# Patient Record
Sex: MALE | Race: WHITE | NOT HISPANIC OR LATINO | Employment: FULL TIME | URBAN - METROPOLITAN AREA
[De-identification: names, ages, dates, MRNs, and addresses within clinical notes are randomized per-mention and may not be internally consistent; named-entity substitution may affect disease eponyms.]

---

## 2023-10-31 ENCOUNTER — TELEPHONE (OUTPATIENT)
Dept: PRIMARY CARE | Facility: CLINIC | Age: 63
End: 2023-10-31
Payer: COMMERCIAL

## 2023-11-01 ENCOUNTER — LAB (OUTPATIENT)
Dept: LAB | Facility: LAB | Age: 63
End: 2023-11-01
Payer: COMMERCIAL

## 2023-11-01 DIAGNOSIS — Z86.010 PERSONAL HISTORY OF COLONIC POLYPS: Primary | ICD-10-CM

## 2023-11-01 DIAGNOSIS — R74.8 ELEVATED LIVER ENZYMES: Primary | ICD-10-CM

## 2023-11-01 DIAGNOSIS — R10.13 ACUTE EPIGASTRIC PAIN: ICD-10-CM

## 2023-11-01 DIAGNOSIS — R10.13 EPIGASTRIC PAIN: Primary | ICD-10-CM

## 2023-11-01 DIAGNOSIS — R10.13 EPIGASTRIC PAIN: ICD-10-CM

## 2023-11-01 LAB
ALBUMIN SERPL BCP-MCNC: 4.3 G/DL (ref 3.4–5)
ALP SERPL-CCNC: 229 U/L (ref 33–136)
ALT SERPL W P-5'-P-CCNC: 399 U/L (ref 10–52)
AMYLASE SERPL-CCNC: 38 U/L (ref 29–103)
ANION GAP SERPL CALC-SCNC: 15 MMOL/L (ref 10–20)
AST SERPL W P-5'-P-CCNC: 183 U/L (ref 9–39)
BASOPHILS # BLD AUTO: 0.04 X10*3/UL (ref 0–0.1)
BASOPHILS NFR BLD AUTO: 0.8 %
BILIRUB SERPL-MCNC: 2.5 MG/DL (ref 0–1.2)
BUN SERPL-MCNC: 20 MG/DL (ref 6–23)
CALCIUM SERPL-MCNC: 9.6 MG/DL (ref 8.6–10.6)
CHLORIDE SERPL-SCNC: 98 MMOL/L (ref 98–107)
CHOLEST SERPL-MCNC: 203 MG/DL (ref 0–199)
CHOLESTEROL/HDL RATIO: 3.2
CO2 SERPL-SCNC: 29 MMOL/L (ref 21–32)
CREAT SERPL-MCNC: 1.38 MG/DL (ref 0.5–1.3)
EOSINOPHIL # BLD AUTO: 0.15 X10*3/UL (ref 0–0.7)
EOSINOPHIL NFR BLD AUTO: 3 %
ERYTHROCYTE [DISTWIDTH] IN BLOOD BY AUTOMATED COUNT: 12.7 % (ref 11.5–14.5)
GFR SERPL CREATININE-BSD FRML MDRD: 57 ML/MIN/1.73M*2
GGT SERPL-CCNC: 1040 U/L (ref 5–64)
GLUCOSE SERPL-MCNC: 324 MG/DL (ref 74–99)
HCT VFR BLD AUTO: 52.6 % (ref 41–52)
HDLC SERPL-MCNC: 62.6 MG/DL
HGB BLD-MCNC: 18.2 G/DL (ref 13.5–17.5)
IMM GRANULOCYTES # BLD AUTO: 0.01 X10*3/UL (ref 0–0.7)
IMM GRANULOCYTES NFR BLD AUTO: 0.2 % (ref 0–0.9)
LDLC SERPL CALC-MCNC: 98 MG/DL
LIPASE SERPL-CCNC: 61 U/L (ref 9–82)
LYMPHOCYTES # BLD AUTO: 1.63 X10*3/UL (ref 1.2–4.8)
LYMPHOCYTES NFR BLD AUTO: 32.3 %
MCH RBC QN AUTO: 29.7 PG (ref 26–34)
MCHC RBC AUTO-ENTMCNC: 34.6 G/DL (ref 32–36)
MCV RBC AUTO: 86 FL (ref 80–100)
MONOCYTES # BLD AUTO: 0.48 X10*3/UL (ref 0.1–1)
MONOCYTES NFR BLD AUTO: 9.5 %
NEUTROPHILS # BLD AUTO: 2.74 X10*3/UL (ref 1.2–7.7)
NEUTROPHILS NFR BLD AUTO: 54.2 %
NON HDL CHOLESTEROL: 140 MG/DL (ref 0–149)
NRBC BLD-RTO: 0 /100 WBCS (ref 0–0)
PLATELET # BLD AUTO: 202 X10*3/UL (ref 150–450)
POTASSIUM SERPL-SCNC: 3.8 MMOL/L (ref 3.5–5.3)
PROT SERPL-MCNC: 6.8 G/DL (ref 6.4–8.2)
PSA SERPL-MCNC: 1.65 NG/ML
RBC # BLD AUTO: 6.13 X10*6/UL (ref 4.5–5.9)
SODIUM SERPL-SCNC: 138 MMOL/L (ref 136–145)
TRIGL SERPL-MCNC: 214 MG/DL (ref 0–149)
VLDL: 43 MG/DL (ref 0–40)
WBC # BLD AUTO: 5.1 X10*3/UL (ref 4.4–11.3)

## 2023-11-01 PROCEDURE — 36415 COLL VENOUS BLD VENIPUNCTURE: CPT

## 2023-11-01 PROCEDURE — 84153 ASSAY OF PSA TOTAL: CPT

## 2023-11-01 PROCEDURE — 80053 COMPREHEN METABOLIC PANEL: CPT

## 2023-11-01 PROCEDURE — 82150 ASSAY OF AMYLASE: CPT

## 2023-11-01 PROCEDURE — 83690 ASSAY OF LIPASE: CPT

## 2023-11-01 PROCEDURE — 85025 COMPLETE CBC W/AUTO DIFF WBC: CPT

## 2023-11-01 PROCEDURE — 82977 ASSAY OF GGT: CPT

## 2023-11-01 PROCEDURE — 80061 LIPID PANEL: CPT

## 2023-11-02 ENCOUNTER — HOSPITAL ENCOUNTER (OUTPATIENT)
Dept: RADIOLOGY | Facility: HOSPITAL | Age: 63
Discharge: HOME | End: 2023-11-02
Payer: COMMERCIAL

## 2023-11-02 DIAGNOSIS — R74.8 ELEVATED LIVER ENZYMES: ICD-10-CM

## 2023-11-02 DIAGNOSIS — R10.9 ABDOMINAL PAIN WITH JAUNDICE: Primary | ICD-10-CM

## 2023-11-02 DIAGNOSIS — R17 ABDOMINAL PAIN WITH JAUNDICE: Primary | ICD-10-CM

## 2023-11-02 DIAGNOSIS — R73.9 HYPERGLYCEMIA: Primary | ICD-10-CM

## 2023-11-02 PROCEDURE — 76705 ECHO EXAM OF ABDOMEN: CPT | Mod: DISTINCT PROCEDURAL SERVICE | Performed by: RADIOLOGY

## 2023-11-02 PROCEDURE — 76705 ECHO EXAM OF ABDOMEN: CPT | Mod: 59

## 2023-11-02 PROCEDURE — 93975 VASCULAR STUDY: CPT

## 2023-11-02 PROCEDURE — 93976 VASCULAR STUDY: CPT | Mod: DISTINCT PROCEDURAL SERVICE | Performed by: RADIOLOGY

## 2023-11-03 ENCOUNTER — LAB (OUTPATIENT)
Dept: LAB | Facility: LAB | Age: 63
End: 2023-11-03
Payer: COMMERCIAL

## 2023-11-03 DIAGNOSIS — R74.8 ELEVATED LIVER ENZYMES: ICD-10-CM

## 2023-11-03 DIAGNOSIS — R10.9 ABDOMINAL PAIN WITH JAUNDICE: ICD-10-CM

## 2023-11-03 DIAGNOSIS — R17 ABDOMINAL PAIN WITH JAUNDICE: ICD-10-CM

## 2023-11-03 DIAGNOSIS — R73.9 HYPERGLYCEMIA: ICD-10-CM

## 2023-11-03 LAB
ALBUMIN SERPL BCP-MCNC: 3.9 G/DL (ref 3.4–5)
ALP SERPL-CCNC: 274 U/L (ref 33–136)
ALT SERPL W P-5'-P-CCNC: 365 U/L (ref 10–52)
ANION GAP SERPL CALC-SCNC: 14 MMOL/L (ref 10–20)
AST SERPL W P-5'-P-CCNC: 146 U/L (ref 9–39)
BILIRUB SERPL-MCNC: 2 MG/DL (ref 0–1.2)
BUN SERPL-MCNC: 20 MG/DL (ref 6–23)
CALCIUM SERPL-MCNC: 9 MG/DL (ref 8.6–10.6)
CHLORIDE SERPL-SCNC: 98 MMOL/L (ref 98–107)
CO2 SERPL-SCNC: 29 MMOL/L (ref 21–32)
CREAT SERPL-MCNC: 1.31 MG/DL (ref 0.5–1.3)
EST. AVERAGE GLUCOSE BLD GHB EST-MCNC: 209 MG/DL
GFR SERPL CREATININE-BSD FRML MDRD: 61 ML/MIN/1.73M*2
GLUCOSE SERPL-MCNC: 300 MG/DL (ref 74–99)
HAV IGM SER QL: NONREACTIVE
HBA1C MFR BLD: 8.9 %
HBV CORE IGM SER QL: NONREACTIVE
HBV SURFACE AG SERPL QL IA: NONREACTIVE
HCV AB SER QL: NONREACTIVE
INR PPP: 0.9 (ref 0.9–1.1)
POTASSIUM SERPL-SCNC: 4.1 MMOL/L (ref 3.5–5.3)
PROT SERPL-MCNC: 6.4 G/DL (ref 6.4–8.2)
PROTHROMBIN TIME: 10.3 SECONDS (ref 9.8–12.8)
SODIUM SERPL-SCNC: 137 MMOL/L (ref 136–145)

## 2023-11-03 PROCEDURE — 83036 HEMOGLOBIN GLYCOSYLATED A1C: CPT

## 2023-11-03 PROCEDURE — 80053 COMPREHEN METABOLIC PANEL: CPT

## 2023-11-03 PROCEDURE — 86038 ANTINUCLEAR ANTIBODIES: CPT

## 2023-11-03 PROCEDURE — 36415 COLL VENOUS BLD VENIPUNCTURE: CPT

## 2023-11-03 PROCEDURE — 80074 ACUTE HEPATITIS PANEL: CPT

## 2023-11-03 PROCEDURE — 85610 PROTHROMBIN TIME: CPT

## 2023-11-06 ENCOUNTER — TELEPHONE (OUTPATIENT)
Dept: PRIMARY CARE | Facility: CLINIC | Age: 63
End: 2023-11-06
Payer: COMMERCIAL

## 2023-11-06 DIAGNOSIS — K75.9 HEPATITIS: Primary | ICD-10-CM

## 2023-11-06 DIAGNOSIS — K52.9 ACUTE GASTROENTERITIS: ICD-10-CM

## 2023-11-06 DIAGNOSIS — E11.9 TYPE 2 DIABETES MELLITUS WITHOUT COMPLICATION, WITHOUT LONG-TERM CURRENT USE OF INSULIN (MULTI): ICD-10-CM

## 2023-11-06 LAB — ANA SER QL HEP2 SUBST: NEGATIVE

## 2023-11-08 ENCOUNTER — OFFICE VISIT (OUTPATIENT)
Dept: GASTROENTEROLOGY | Facility: HOSPITAL | Age: 63
End: 2023-11-08
Payer: COMMERCIAL

## 2023-11-08 VITALS
TEMPERATURE: 96.9 F | DIASTOLIC BLOOD PRESSURE: 85 MMHG | HEIGHT: 70 IN | WEIGHT: 184.7 LBS | OXYGEN SATURATION: 97 % | SYSTOLIC BLOOD PRESSURE: 134 MMHG | BODY MASS INDEX: 26.44 KG/M2 | HEART RATE: 82 BPM | RESPIRATION RATE: 18 BRPM

## 2023-11-08 DIAGNOSIS — R10.13 ACUTE EPIGASTRIC PAIN: ICD-10-CM

## 2023-11-08 DIAGNOSIS — R79.89 ELEVATED LFTS: Primary | ICD-10-CM

## 2023-11-08 DIAGNOSIS — K76.0 STEATOSIS OF LIVER: ICD-10-CM

## 2023-11-08 DIAGNOSIS — R73.9 HYPERGLYCEMIA: ICD-10-CM

## 2023-11-08 PROCEDURE — 99214 OFFICE O/P EST MOD 30 MIN: CPT | Performed by: INTERNAL MEDICINE

## 2023-11-08 PROCEDURE — 1036F TOBACCO NON-USER: CPT | Performed by: INTERNAL MEDICINE

## 2023-11-08 PROCEDURE — 99204 OFFICE O/P NEW MOD 45 MIN: CPT | Performed by: INTERNAL MEDICINE

## 2023-11-08 RX ORDER — HYDROGEN PEROXIDE 3 %
20 SOLUTION, NON-ORAL MISCELLANEOUS
COMMUNITY
Start: 2021-07-27

## 2023-11-08 SDOH — ECONOMIC STABILITY: FOOD INSECURITY: WITHIN THE PAST 12 MONTHS, YOU WORRIED THAT YOUR FOOD WOULD RUN OUT BEFORE YOU GOT MONEY TO BUY MORE.: NEVER TRUE

## 2023-11-08 SDOH — ECONOMIC STABILITY: FOOD INSECURITY: WITHIN THE PAST 12 MONTHS, THE FOOD YOU BOUGHT JUST DIDN'T LAST AND YOU DIDN'T HAVE MONEY TO GET MORE.: NEVER TRUE

## 2023-11-08 ASSESSMENT — LIFESTYLE VARIABLES
HOW OFTEN DURING THE LAST YEAR HAVE YOU BEEN UNABLE TO REMEMBER WHAT HAPPENED THE NIGHT BEFORE BECAUSE YOU HAD BEEN DRINKING: NEVER
HOW OFTEN DO YOU HAVE SIX OR MORE DRINKS ON ONE OCCASION: LESS THAN MONTHLY
AUDIT TOTAL SCORE: 5
HOW OFTEN DURING THE LAST YEAR HAVE YOU FOUND THAT YOU WERE NOT ABLE TO STOP DRINKING ONCE YOU HAD STARTED: NEVER
HOW OFTEN DURING THE LAST YEAR HAVE YOU HAD A FEELING OF GUILT OR REMORSE AFTER DRINKING: NEVER
HOW MANY STANDARD DRINKS CONTAINING ALCOHOL DO YOU HAVE ON A TYPICAL DAY: 3 OR 4
AUDIT-C TOTAL SCORE: 5
HAS A RELATIVE, FRIEND, DOCTOR, OR ANOTHER HEALTH PROFESSIONAL EXPRESSED CONCERN ABOUT YOUR DRINKING OR SUGGESTED YOU CUT DOWN: NO
HAVE YOU OR SOMEONE ELSE BEEN INJURED AS A RESULT OF YOUR DRINKING: NO
HOW OFTEN DURING THE LAST YEAR HAVE YOU NEEDED AN ALCOHOLIC DRINK FIRST THING IN THE MORNING TO GET YOURSELF GOING AFTER A NIGHT OF HEAVY DRINKING: NEVER
HOW OFTEN DO YOU HAVE A DRINK CONTAINING ALCOHOL: 2-3 TIMES A WEEK
SKIP TO QUESTIONS 9-10: 0
HOW OFTEN DURING THE LAST YEAR HAVE YOU FAILED TO DO WHAT WAS NORMALLY EXPECTED FROM YOU BECAUSE OF DRINKING: NEVER

## 2023-11-08 ASSESSMENT — PATIENT HEALTH QUESTIONNAIRE - PHQ9
2. FEELING DOWN, DEPRESSED OR HOPELESS: NOT AT ALL
1. LITTLE INTEREST OR PLEASURE IN DOING THINGS: NOT AT ALL
SUM OF ALL RESPONSES TO PHQ9 QUESTIONS 1 AND 2: 0

## 2023-11-08 ASSESSMENT — PAIN SCALES - GENERAL: PAINLEVEL: 0-NO PAIN

## 2023-11-08 NOTE — PATIENT INSTRUCTIONS
Welcome to Dr. [facility].  Dr. Leslie sees patients at the following sites:  [place] [address] [facility], [address] at [facility] at [facility], [facility] [address]    Dr. Leslie's hepatology care coordinator, Berna OSMAN, can be reached at [phone].  Dr. Leslie's , Anastasiya Khalil, can be reached at [phone].      Will check additional labwork tomorrow.    Differential - includes medication related (losartan), biliary tract, intrinsic liver injury    Based on trend in LFTs and GI related symptoms, will consider advanced imaging.

## 2023-11-08 NOTE — PROGRESS NOTES
Hepatology Clinic Consult Note    Reason For Consult  Elevated LFTs    History Of Present Illness  Mane Chong is a 63 y.o. male with a past medical history of cholelithiasis/cholecystitis s/p cholecystectomy 2013, who was recently noted to have high liver enzymes.  He is referred for further evaluation.    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  He is originally from [place], but has lived in the [place] for many years.    About 10 years ago he had an episode of severe cholecystitis and probably cholangitis related to gallstone disease.  As part of this episode he describes that he even developed post ERCP pancreatitis.  He ultimately underwent a cholecystectomy, which was done at the [facility].  He explains that he actually recovered well from the surgery, never really had any problems afterwards.    More recently about 2 to 3 weeks ago he began to experience some abdominal discomfort in the upper abdomen.  There was some bloating.  The symptoms were new.  His primary care physician, Dr. Tapia, ordered some lab work, which was done on November 1, 2023.  Pancreatic enzymes were normal.  His fasting glucose level was quite high at 324 mg/dL.  His ALT was also markedly elevated at 399 units/L, , alkaline phosphatase 229, total bilirubin of 2.5.     He recalls that prior imaging sometime ago did describe some fatty liver changes.  Apart from that, he has no known history of liver disease or liver problems.  He only has a history of complicated gallstone disease from 10 years ago.    Follow-up lab work was done a few days later.  I hemoglobin A1c level was checked and was 8.9%.  He has never been diagnosed with diabetes before.  An acute hepatitis panel was negative for hepatitis AB or C infection.  An REMY was checked and was negative.  His liver enzymes were rechecked and were persistently high with an ALT of 365, and AST of 146, and alkaline phosphatase of 274 and a  bilirubin level of 2.0.    A few days later on November 4 he reports that he went out for dinner with his wife.  Had a fish dish and several hours after dinner he developed very sharp and burning midepigastric pain.  He describes it as very intense and persisted in the morning.  He took a PPI 40 mg x 1 and then about 12 hours later took a second dose of 20 mg.  Later on Sunday he said he had resolution of his symptoms.  For the last 2 to 3 weeks he has been taking famotidine.  He says he is more or less symptom-free the last few days since this happened over the weekend.    During this.  He has basically eliminated all alcohol consumption.  He decreased his coffee consumption.  He has been very careful with his diet.    He has noted some darkening of his urine which has improved.  But he does not report any symptoms of polyuria or polydipsia.  He does not report any major changes in his weight.    Earlier this year he began to take losartan 50 mg as part of treatment of hypertension.  He thinks this was prescribed some 6 months ago.  He is also continue glucosamine and Q10.  He had contacted Dr. Rollins several days ago -who instructed him to stop all of these medications and supplements.    He was prescribed Januvia for this new diagnosis of diabetes, but has not yet started this medication.    He is here for further evaluation of all of these issues.    His past medical history is otherwise notable for new diagnosis of hypertension and he also has prior history of kidney stones.      His past surgical history is most notable for ERCP, cholecystectomy 10 years ago as described above in the HPI.    He did have a colonoscopy in 2020.    Currently he stopped taking his medications.  Most notably he had been taking losartan 50 mg daily, which was relatively new prescription.    There is no known liver disease in the family.  He believes he has a maternal uncle who had stomach cancer.    He does not smoke cigarettes.  We  did discuss his alcohol consumption.  He says that he would typically drink some wine during the week but not every night on the weekend he often would have 1-2 cocktails on Saturdays and Sundays.  He never has characterized his drinking as excessive.  With the onset of all the symptoms and concerns he has stopped all alcohol consumption.  There is no history of recreational drug use.    He is  with children.  He is an electrophysiologist/cardiologist.    There is no significant recent travels or illnesses.               Past Medical History  Past Medical History:   Diagnosis Date    Personal history of other diseases of the circulatory system     History of hypertension       Surgical History  Past Surgical History:   Procedure Laterality Date    OTHER SURGICAL HISTORY  08/06/2021    Kidney surgery       Social History  Social Determinants of Health     Tobacco Use: Not on file   Alcohol Use: Not on file   Financial Resource Strain: Not on file   Food Insecurity: Not on file   Transportation Needs: Not on file   Physical Activity: Not on file   Stress: Not on file   Social Connections: Not on file   Intimate Partner Violence: Not on file   Depression: Not on file   Housing Stability: Not on file   Utilities: Not on file   Digital Equity: Not on file       Family History  No family history on file.     Allergies  Not on File    Home Medications    Current Outpatient Medications:     SITagliptin phosphate (Januvia) 100 mg tablet, Take 1 tablet (100 mg) by mouth once daily., Disp: 30 tablet, Rfl: 11    Review of Systems  Please refer to the new patient questionnaire for full comprehensive review of systems, patient reported.     Vitals:    11/08/23 1202   BP: 134/85   Pulse: 82   Resp: 18   Temp: 36.1 °C (96.9 °F)   SpO2: 97%       Physical Exam  General: well-nourished, no acute distress  HEENT: PERRLA, EOM intact, no scleral icterus, moist MM  Respiratory: CTA bilaterally, normal work of  breathing  Cardiovascular: RRR, no murmurs/rubs/gallops  Abdomen: Soft, nontender, nondistended, bowel sounds present, no masses palpated, no organomeagly  Extremities: no edema, no asterixis  Neuro: alert and oriented, CNII-XII grossly intact, moves all 4 extremities with no focal deficits     Last Recorded Vitals  There were no vitals taken for this visit.      Relevant Results    Current Outpatient Medications:     SITagliptin phosphate (Januvia) 100 mg tablet, Take 1 tablet (100 mg) by mouth once daily., Disp: 30 tablet, Rfl: 11     Lab Results   Component Value Date    WBC 5.1 2023    HGB 18.2 (H) 2023    HCT 52.6 (H) 2023    MCV 86 2023     2023     Lab Results   Component Value Date    GLUCOSE 300 (H) 2023    CALCIUM 9.0 2023     2023    K 4.1 2023    CO2 29 2023    CL 98 2023    BUN 20 2023    CREATININE 1.31 (H) 2023     Lab Results   Component Value Date     (H) 2023     (H) 2023    GGT 1,040 (H) 2023    ALKPHOS 274 (H) 2023    BILITOT 2.0 (H) 2023     Lab Results   Component Value Date    HEPAIGM Nonreactive 2023    HEPBCIGM Nonreactive 2023    HEPCAB Nonreactive 2023       Imagin2023 US Liver  IMPRESSION:  Findings of hepatic steatosis without morphologic evidence of  cirrhosis or focal hepatic lesion evident.    GI Procedures:     Colonoscopy  Impression:            - One large polyp in the sigmoid colon, removed with a                          hot snare. Resected and retrieved. Clips (MR                          conditional) were placed.                         - One 25 mm polyp in the ascending colon, removed with                          mucosal resection. Resected and retrieved. Clips (MR                          conditional) were placed.                         - A few small polyps in the descending colon and in                           the ascending colon, removed with a cold snare.                          Resected and retrieved.                         - The examination was otherwise normal.    7/2020 FINAL DIAGNOSIS   A.  COLON, SIGMOID POLYP, BIOPSY:       - TUBULAR ADENOMA.     B.  COLON, ASCENDING POLYP (X3), BIOPSY:       - TUBULAR ADENOMAS.     C.  COLON, DESCENDING POLYP, BIOPSY:       - HYPERPLASTIC POLYP.          ASSESSMENT/PLAN:    Elevated LFTs (new)    FIB-4 2.38    I believe, there is a broad differential diagnosis here.  Given his very clear description of GI symptoms for the last 2 to 3 weeks which came to víctor over the weekend I am somewhat concerned for biliary pancreatico disease: Perhaps a retained stone or even a stricture, given his history.  There clearly may be an intrinsic liver process going on as well.  1 possibility is a drug-induced liver injury perhaps from losartan.  Angiotensin receptor blockers have also been implicated and causing celiac sprue type symptoms, which may explain some of his symptoms over the last 2 to 3 weeks.  I also suspect there may be underlying chronic liver disease such as metabolic associated liver disease or even some alcohol-related fatty liver disease (given the hyperglycemia and diabetes diagnosis as well as some of his reported alcohol consumption).    I explained that he is feeling better with resolution of some of his gastrointestinal symptoms we will recheck his liver enzymes and follow them closely we will also conduct a more comprehensive work-up -including autoimmune serologies, and other tests to exclude concomitant liver disorders.  If his symptoms recur and or his liver enzymes do not normalize, I probably would first recommend pursuing some advanced imaging -perhaps an EGD EUS versus a CT or an MRI.  Based on some of testing we can also need to consider liver biopsy    I spent 50 minutes in the professional and overall care of this patient.  I discussed the plan at  length with the patient and my reasoning and thought process.    Jt Leslie MD

## 2023-11-08 NOTE — LETTER
November 10, 2023     Prince Rollins DO  [address] [facility] [place], [place] [address]  [facility] [address]    Patient: Mane Chong   YOB: 1960   Date of Visit: 11/8/2023       Dear Dr. Prince Rollins, :    Thank you for referring Mane Chong to me for evaluation. Below are my notes for this consultation.  If you have questions, please do not hesitate to call me. I look forward to following your patient along with you.       Sincerely,     Jt Leslie MD      CC: Jaziel Tapia MD  ______________________________________________________________________________________    Hepatology Clinic Consult Note    Reason For Consult  Elevated LFTs    History Of Present Illness  Mane Chong is a 63 y.o. male with a past medical history of cholelithiasis/cholecystitis s/p cholecystectomy 2013, who was recently noted to have high liver enzymes.  He is referred for further evaluation.    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  He is originally from [place], but has lived in the [place] for many years.    About 10 years ago he had an episode of severe cholecystitis and probably cholangitis related to gallstone disease.  As part of this episode he describes that he even developed post ERCP pancreatitis.  He ultimately underwent a cholecystectomy, which was done at the [facility].  He explains that he actually recovered well from the surgery, never really had any problems afterwards.    More recently about 2 to 3 weeks ago he began to experience some abdominal discomfort in the upper abdomen.  There was some bloating.  The symptoms were new.  His primary care physician, Dr. Tapia, ordered some lab work, which was done on November 1, 2023.  Pancreatic enzymes were normal.  His fasting glucose level was quite high at 324 mg/dL.  His ALT was also markedly elevated at 399 units/L, , alkaline  phosphatase 229, total bilirubin of 2.5.     He recalls that prior imaging sometime ago did describe some fatty liver changes.  Apart from that, he has no known history of liver disease or liver problems.  He only has a history of complicated gallstone disease from 10 years ago.    Follow-up lab work was done a few days later.  I hemoglobin A1c level was checked and was 8.9%.  He has never been diagnosed with diabetes before.  An acute hepatitis panel was negative for hepatitis AB or C infection.  An REMY was checked and was negative.  His liver enzymes were rechecked and were persistently high with an ALT of 365, and AST of 146, and alkaline phosphatase of 274 and a bilirubin level of 2.0.    A few days later on November 4 he reports that he went out for dinner with his wife.  Had a fish dish and several hours after dinner he developed very sharp and burning midepigastric pain.  He describes it as very intense and persisted in the morning.  He took a PPI 40 mg x 1 and then about 12 hours later took a second dose of 20 mg.  Later on Sunday he said he had resolution of his symptoms.  For the last 2 to 3 weeks he has been taking famotidine.  He says he is more or less symptom-free the last few days since this happened over the weekend.    During this.  He has basically eliminated all alcohol consumption.  He decreased his coffee consumption.  He has been very careful with his diet.    He has noted some darkening of his urine which has improved.  But he does not report any symptoms of polyuria or polydipsia.  He does not report any major changes in his weight.    Earlier this year he began to take losartan 50 mg as part of treatment of hypertension.  He thinks this was prescribed some 6 months ago.  He is also continue glucosamine and Q10.  He had contacted Dr. Rollins several days ago -who instructed him to stop all of these medications and supplements.    He was prescribed Januvia for this new diagnosis of diabetes, but  has not yet started this medication.    He is here for further evaluation of all of these issues.    His past medical history is otherwise notable for new diagnosis of hypertension and he also has prior history of kidney stones.      His past surgical history is most notable for ERCP, cholecystectomy 10 years ago as described above in the HPI.    He did have a colonoscopy in 2020.    Currently he stopped taking his medications.  Most notably he had been taking losartan 50 mg daily, which was relatively new prescription.    There is no known liver disease in the family.  He believes he has a maternal uncle who had stomach cancer.    He does not smoke cigarettes.  We did discuss his alcohol consumption.  He says that he would typically drink some wine during the week but not every night on the weekend he often would have 1-2 cocktails on Saturdays and Sundays.  He never has characterized his drinking as excessive.  With the onset of all the symptoms and concerns he has stopped all alcohol consumption.  There is no history of recreational drug use.    He is  with children.  He is an electrophysiologist/cardiologist.    There is no significant recent travels or illnesses.               Past Medical History  Past Medical History:   Diagnosis Date   • Personal history of other diseases of the circulatory system     History of hypertension       Surgical History  Past Surgical History:   Procedure Laterality Date   • OTHER SURGICAL HISTORY  08/06/2021    Kidney surgery       Social History  Social Determinants of Health     Tobacco Use: Not on file   Alcohol Use: Not on file   Financial Resource Strain: Not on file   Food Insecurity: Not on file   Transportation Needs: Not on file   Physical Activity: Not on file   Stress: Not on file   Social Connections: Not on file   Intimate Partner Violence: Not on file   Depression: Not on file   Housing Stability: Not on file   Utilities: Not on file   Digital Equity: Not on  file       Family History  No family history on file.     Allergies  Not on File    Home Medications    Current Outpatient Medications:   •  SITagliptin phosphate (Januvia) 100 mg tablet, Take 1 tablet (100 mg) by mouth once daily., Disp: 30 tablet, Rfl: 11    Review of Systems  Please refer to the new patient questionnaire for full comprehensive review of systems, patient reported.     Vitals:    23 1202   BP: 134/85   Pulse: 82   Resp: 18   Temp: 36.1 °C (96.9 °F)   SpO2: 97%       Physical Exam  General: well-nourished, no acute distress  HEENT: PERRLA, EOM intact, no scleral icterus, moist MM  Respiratory: CTA bilaterally, normal work of breathing  Cardiovascular: RRR, no murmurs/rubs/gallops  Abdomen: Soft, nontender, nondistended, bowel sounds present, no masses palpated, no organomeagly  Extremities: no edema, no asterixis  Neuro: alert and oriented, CNII-XII grossly intact, moves all 4 extremities with no focal deficits     Last Recorded Vitals  There were no vitals taken for this visit.      Relevant Results    Current Outpatient Medications:   •  SITagliptin phosphate (Januvia) 100 mg tablet, Take 1 tablet (100 mg) by mouth once daily., Disp: 30 tablet, Rfl: 11     Lab Results   Component Value Date    WBC 5.1 2023    HGB 18.2 (H) 2023    HCT 52.6 (H) 2023    MCV 86 2023     2023     Lab Results   Component Value Date    GLUCOSE 300 (H) 2023    CALCIUM 9.0 2023     2023    K 4.1 2023    CO2 29 2023    CL 98 2023    BUN 20 2023    CREATININE 1.31 (H) 2023     Lab Results   Component Value Date     (H) 2023     (H) 2023    GGT 1,040 (H) 2023    ALKPHOS 274 (H) 2023    BILITOT 2.0 (H) 2023     Lab Results   Component Value Date    HEPAIGM Nonreactive 2023    HEPBCIGM Nonreactive 2023    HEPCAB Nonreactive 2023       Imagin2023 US  Liver  IMPRESSION:  Findings of hepatic steatosis without morphologic evidence of  cirrhosis or focal hepatic lesion evident.    GI Procedures:    2020 Colonoscopy  Impression:            - One large polyp in the sigmoid colon, removed with a                          hot snare. Resected and retrieved. Clips (MR                          conditional) were placed.                         - One 25 mm polyp in the ascending colon, removed with                          mucosal resection. Resected and retrieved. Clips (MR                          conditional) were placed.                         - A few small polyps in the descending colon and in                          the ascending colon, removed with a cold snare.                          Resected and retrieved.                         - The examination was otherwise normal.    7/2020 FINAL DIAGNOSIS   A.  COLON, SIGMOID POLYP, BIOPSY:       - TUBULAR ADENOMA.     B.  COLON, ASCENDING POLYP (X3), BIOPSY:       - TUBULAR ADENOMAS.     C.  COLON, DESCENDING POLYP, BIOPSY:       - HYPERPLASTIC POLYP.          ASSESSMENT/PLAN:    Elevated LFTs (new)    FIB-4 2.38    I believe, there is a broad differential diagnosis here.  Given his very clear description of GI symptoms for the last 2 to 3 weeks which came to víctor over the weekend I am somewhat concerned for biliary pancreatico disease: Perhaps a retained stone or even a stricture, given his history.  There clearly may be an intrinsic liver process going on as well.  1 possibility is a drug-induced liver injury perhaps from losartan.  Angiotensin receptor blockers have also been implicated and causing celiac sprue type symptoms, which may explain some of his symptoms over the last 2 to 3 weeks.  I also suspect there may be underlying chronic liver disease such as metabolic associated liver disease or even some alcohol-related fatty liver disease (given the hyperglycemia and diabetes diagnosis as well as some of his  reported alcohol consumption).    I explained that he is feeling better with resolution of some of his gastrointestinal symptoms we will recheck his liver enzymes and follow them closely we will also conduct a more comprehensive work-up -including autoimmune serologies, and other tests to exclude concomitant liver disorders.  If his symptoms recur and or his liver enzymes do not normalize, I probably would first recommend pursuing some advanced imaging -perhaps an EGD EUS versus a CT or an MRI.  Based on some of testing we can also need to consider liver biopsy    I spent 50 minutes in the professional and overall care of this patient.  I discussed the plan at length with the patient and my reasoning and thought process.    Jt Leslie MD

## 2023-11-09 ENCOUNTER — LAB (OUTPATIENT)
Dept: LAB | Facility: LAB | Age: 63
End: 2023-11-09
Payer: COMMERCIAL

## 2023-11-09 DIAGNOSIS — R73.9 HYPERGLYCEMIA: ICD-10-CM

## 2023-11-09 DIAGNOSIS — R79.89 ELEVATED LFTS: ICD-10-CM

## 2023-11-09 DIAGNOSIS — K76.0 STEATOSIS OF LIVER: ICD-10-CM

## 2023-11-09 DIAGNOSIS — R10.13 ACUTE EPIGASTRIC PAIN: ICD-10-CM

## 2023-11-09 DIAGNOSIS — K75.9 HEPATITIS: ICD-10-CM

## 2023-11-09 LAB
ALBUMIN SERPL BCP-MCNC: 4.1 G/DL (ref 3.4–5)
ALP SERPL-CCNC: 163 U/L (ref 33–136)
ALT SERPL W P-5'-P-CCNC: 79 U/L (ref 10–52)
ANION GAP SERPL CALC-SCNC: 13 MMOL/L (ref 10–20)
AST SERPL W P-5'-P-CCNC: 31 U/L (ref 9–39)
BILIRUB DIRECT SERPL-MCNC: 0.3 MG/DL (ref 0–0.3)
BILIRUB SERPL-MCNC: 1.2 MG/DL (ref 0–1.2)
BUN SERPL-MCNC: 20 MG/DL (ref 6–23)
CALCIUM SERPL-MCNC: 9.7 MG/DL (ref 8.6–10.6)
CERULOPLASMIN SERPL-MCNC: 25.9 MG/DL (ref 20–60)
CHLORIDE SERPL-SCNC: 104 MMOL/L (ref 98–107)
CO2 SERPL-SCNC: 29 MMOL/L (ref 21–32)
CREAT SERPL-MCNC: 1.26 MG/DL (ref 0.5–1.3)
ERYTHROCYTE [DISTWIDTH] IN BLOOD BY AUTOMATED COUNT: 12.2 % (ref 11.5–14.5)
FERRITIN SERPL-MCNC: 955 NG/ML (ref 20–300)
GFR SERPL CREATININE-BSD FRML MDRD: 64 ML/MIN/1.73M*2
GLIADIN PEPTIDE IGA SER IA-ACNC: 1.7 U/ML
GLIADIN PEPTIDE IGG SER IA-ACNC: <1 U/ML
GLUCOSE SERPL-MCNC: 210 MG/DL (ref 74–99)
HAV AB SER QL IA: REACTIVE
HBV CORE AB SER QL: NONREACTIVE
HBV SURFACE AB SER-ACNC: <3.1 MIU/ML
HCT VFR BLD AUTO: 50.1 % (ref 41–52)
HGB BLD-MCNC: 17.4 G/DL (ref 13.5–17.5)
IGA SERPL-MCNC: 292 MG/DL (ref 70–400)
IGG SERPL-MCNC: 838 MG/DL (ref 700–1600)
IGM SERPL-MCNC: 75 MG/DL (ref 40–230)
IRON SATN MFR SERPL: 43 % (ref 25–45)
IRON SERPL-MCNC: 124 UG/DL (ref 35–150)
MCH RBC QN AUTO: 30.1 PG (ref 26–34)
MCHC RBC AUTO-ENTMCNC: 34.7 G/DL (ref 32–36)
MCV RBC AUTO: 87 FL (ref 80–100)
NRBC BLD-RTO: 0 /100 WBCS (ref 0–0)
PLATELET # BLD AUTO: 224 X10*3/UL (ref 150–450)
POTASSIUM SERPL-SCNC: 4.4 MMOL/L (ref 3.5–5.3)
PROT SERPL-MCNC: 6.9 G/DL (ref 6.4–8.2)
RBC # BLD AUTO: 5.78 X10*6/UL (ref 4.5–5.9)
SODIUM SERPL-SCNC: 142 MMOL/L (ref 136–145)
TIBC SERPL-MCNC: 289 UG/DL (ref 240–445)
TTG IGA SER IA-ACNC: <1 U/ML
TTG IGG SER IA-ACNC: 1 U/ML
UIBC SERPL-MCNC: 165 UG/DL (ref 110–370)
WBC # BLD AUTO: 5.4 X10*3/UL (ref 4.4–11.3)

## 2023-11-09 PROCEDURE — 83550 IRON BINDING TEST: CPT

## 2023-11-09 PROCEDURE — 86708 HEPATITIS A ANTIBODY: CPT

## 2023-11-09 PROCEDURE — 82728 ASSAY OF FERRITIN: CPT

## 2023-11-09 PROCEDURE — 36415 COLL VENOUS BLD VENIPUNCTURE: CPT

## 2023-11-09 PROCEDURE — 82784 ASSAY IGA/IGD/IGG/IGM EACH: CPT

## 2023-11-09 PROCEDURE — 86706 HEP B SURFACE ANTIBODY: CPT

## 2023-11-09 PROCEDURE — 86258 DGP ANTIBODY EACH IG CLASS: CPT

## 2023-11-09 PROCEDURE — 86381 MITOCHONDRIAL ANTIBODY EACH: CPT

## 2023-11-09 PROCEDURE — 85027 COMPLETE CBC AUTOMATED: CPT

## 2023-11-09 PROCEDURE — 86015 ACTIN ANTIBODY EACH: CPT

## 2023-11-09 PROCEDURE — 86364 TISS TRNSGLTMNASE EA IG CLAS: CPT

## 2023-11-09 PROCEDURE — 82390 ASSAY OF CERULOPLASMIN: CPT

## 2023-11-09 PROCEDURE — 82104 ALPHA-1-ANTITRYPSIN PHENO: CPT

## 2023-11-09 PROCEDURE — 83540 ASSAY OF IRON: CPT

## 2023-11-09 PROCEDURE — 86704 HEP B CORE ANTIBODY TOTAL: CPT

## 2023-11-09 PROCEDURE — 80053 COMPREHEN METABOLIC PANEL: CPT

## 2023-11-09 PROCEDURE — 82248 BILIRUBIN DIRECT: CPT

## 2023-11-09 PROCEDURE — 86256 FLUORESCENT ANTIBODY TITER: CPT

## 2023-11-10 LAB — MITOCHONDRIA AB SER QL IF: NEGATIVE

## 2023-11-13 LAB
A1AT PHENOTYP SERPL-IMP: NORMAL
A1AT SERPL-MCNC: 149 MG/DL (ref 90–200)
SMOOTH MUSCLE AB SER QL IF: ABNORMAL

## 2023-11-27 DIAGNOSIS — R79.89 ELEVATED LFTS: Primary | ICD-10-CM

## 2023-11-27 DIAGNOSIS — K76.0 STEATOSIS OF LIVER: ICD-10-CM

## 2023-11-27 DIAGNOSIS — R10.13 ACUTE EPIGASTRIC PAIN: ICD-10-CM

## 2023-12-15 DIAGNOSIS — K76.0 STEATOSIS OF LIVER: ICD-10-CM

## 2023-12-15 DIAGNOSIS — R79.89 ELEVATED LFTS: Primary | ICD-10-CM

## 2023-12-15 DIAGNOSIS — R73.9 HYPERGLYCEMIA: ICD-10-CM

## 2023-12-19 ENCOUNTER — LAB (OUTPATIENT)
Dept: LAB | Facility: LAB | Age: 63
End: 2023-12-19
Payer: COMMERCIAL

## 2023-12-19 DIAGNOSIS — K76.0 STEATOSIS OF LIVER: ICD-10-CM

## 2023-12-19 DIAGNOSIS — R73.9 HYPERGLYCEMIA: ICD-10-CM

## 2023-12-19 DIAGNOSIS — R79.89 ELEVATED LFTS: ICD-10-CM

## 2023-12-19 DIAGNOSIS — R10.13 ACUTE EPIGASTRIC PAIN: ICD-10-CM

## 2023-12-19 LAB
ALBUMIN SERPL BCP-MCNC: 4.3 G/DL (ref 3.4–5)
ALP SERPL-CCNC: 73 U/L (ref 33–136)
ALT SERPL W P-5'-P-CCNC: 23 U/L (ref 10–52)
ANION GAP SERPL CALC-SCNC: 13 MMOL/L (ref 10–20)
AST SERPL W P-5'-P-CCNC: 21 U/L (ref 9–39)
BILIRUB DIRECT SERPL-MCNC: 0.2 MG/DL (ref 0–0.3)
BILIRUB SERPL-MCNC: 0.9 MG/DL (ref 0–1.2)
BUN SERPL-MCNC: 19 MG/DL (ref 6–23)
CALCIUM SERPL-MCNC: 9.6 MG/DL (ref 8.6–10.6)
CHLORIDE SERPL-SCNC: 105 MMOL/L (ref 98–107)
CHOLEST SERPL-MCNC: 178 MG/DL (ref 0–199)
CHOLESTEROL/HDL RATIO: 3
CO2 SERPL-SCNC: 28 MMOL/L (ref 21–32)
CREAT SERPL-MCNC: 1.25 MG/DL (ref 0.5–1.3)
EST. AVERAGE GLUCOSE BLD GHB EST-MCNC: 123 MG/DL
GFR SERPL CREATININE-BSD FRML MDRD: 65 ML/MIN/1.73M*2
GLUCOSE SERPL-MCNC: 121 MG/DL (ref 74–99)
HBA1C MFR BLD: 5.9 %
HDLC SERPL-MCNC: 58.6 MG/DL
LDLC SERPL CALC-MCNC: 77 MG/DL
NON HDL CHOLESTEROL: 119 MG/DL (ref 0–149)
POTASSIUM SERPL-SCNC: 4.3 MMOL/L (ref 3.5–5.3)
PROT SERPL-MCNC: 6.6 G/DL (ref 6.4–8.2)
SODIUM SERPL-SCNC: 142 MMOL/L (ref 136–145)
TRIGL SERPL-MCNC: 212 MG/DL (ref 0–149)
VLDL: 42 MG/DL (ref 0–40)

## 2023-12-19 PROCEDURE — 83036 HEMOGLOBIN GLYCOSYLATED A1C: CPT

## 2023-12-19 PROCEDURE — 80061 LIPID PANEL: CPT

## 2023-12-19 PROCEDURE — 82248 BILIRUBIN DIRECT: CPT

## 2023-12-19 PROCEDURE — 80053 COMPREHEN METABOLIC PANEL: CPT

## 2023-12-19 PROCEDURE — 36415 COLL VENOUS BLD VENIPUNCTURE: CPT

## 2024-03-04 ENCOUNTER — TELEPHONE (OUTPATIENT)
Dept: PRIMARY CARE | Facility: CLINIC | Age: 64
End: 2024-03-04
Payer: COMMERCIAL

## 2024-03-04 DIAGNOSIS — E11.9 TYPE 2 DIABETES MELLITUS WITHOUT COMPLICATION, WITHOUT LONG-TERM CURRENT USE OF INSULIN (MULTI): Primary | ICD-10-CM

## 2024-03-04 DIAGNOSIS — Z00.00 ROUTINE GENERAL MEDICAL EXAMINATION AT A HEALTH CARE FACILITY: ICD-10-CM

## 2024-03-05 ENCOUNTER — LAB (OUTPATIENT)
Dept: LAB | Facility: LAB | Age: 64
End: 2024-03-05
Payer: COMMERCIAL

## 2024-03-05 DIAGNOSIS — E11.9 TYPE 2 DIABETES MELLITUS WITHOUT COMPLICATION, WITHOUT LONG-TERM CURRENT USE OF INSULIN (MULTI): ICD-10-CM

## 2024-03-05 DIAGNOSIS — Z00.00 ROUTINE GENERAL MEDICAL EXAMINATION AT A HEALTH CARE FACILITY: ICD-10-CM

## 2024-03-05 DIAGNOSIS — K75.9 HEPATITIS: ICD-10-CM

## 2024-03-05 LAB
ALBUMIN SERPL BCP-MCNC: 4.2 G/DL (ref 3.4–5)
ALP SERPL-CCNC: 81 U/L (ref 33–136)
ALT SERPL W P-5'-P-CCNC: 24 U/L (ref 10–52)
ANION GAP SERPL CALC-SCNC: 12 MMOL/L (ref 10–20)
AST SERPL W P-5'-P-CCNC: 19 U/L (ref 9–39)
BASOPHILS # BLD AUTO: 0.03 X10*3/UL (ref 0–0.1)
BASOPHILS NFR BLD AUTO: 0.6 %
BILIRUB SERPL-MCNC: 0.9 MG/DL (ref 0–1.2)
BUN SERPL-MCNC: 17 MG/DL (ref 6–23)
CALCIUM SERPL-MCNC: 9.6 MG/DL (ref 8.6–10.6)
CHLORIDE SERPL-SCNC: 104 MMOL/L (ref 98–107)
CHOLEST SERPL-MCNC: 182 MG/DL (ref 0–199)
CHOLESTEROL/HDL RATIO: 2.7
CO2 SERPL-SCNC: 29 MMOL/L (ref 21–32)
CREAT SERPL-MCNC: 1.22 MG/DL (ref 0.5–1.3)
EGFRCR SERPLBLD CKD-EPI 2021: 66 ML/MIN/1.73M*2
EOSINOPHIL # BLD AUTO: 0.12 X10*3/UL (ref 0–0.7)
EOSINOPHIL NFR BLD AUTO: 2.2 %
ERYTHROCYTE [DISTWIDTH] IN BLOOD BY AUTOMATED COUNT: 12.8 % (ref 11.5–14.5)
EST. AVERAGE GLUCOSE BLD GHB EST-MCNC: 103 MG/DL
GLUCOSE SERPL-MCNC: 122 MG/DL (ref 74–99)
HBA1C MFR BLD: 5.2 %
HCT VFR BLD AUTO: 53.1 % (ref 41–52)
HDLC SERPL-MCNC: 68.3 MG/DL
HGB BLD-MCNC: 18.7 G/DL (ref 13.5–17.5)
IMM GRANULOCYTES # BLD AUTO: 0.01 X10*3/UL (ref 0–0.7)
IMM GRANULOCYTES NFR BLD AUTO: 0.2 % (ref 0–0.9)
LDLC SERPL CALC-MCNC: 92 MG/DL
LYMPHOCYTES # BLD AUTO: 1.61 X10*3/UL (ref 1.2–4.8)
LYMPHOCYTES NFR BLD AUTO: 29.9 %
MCH RBC QN AUTO: 30.5 PG (ref 26–34)
MCHC RBC AUTO-ENTMCNC: 35.2 G/DL (ref 32–36)
MCV RBC AUTO: 87 FL (ref 80–100)
MONOCYTES # BLD AUTO: 0.32 X10*3/UL (ref 0.1–1)
MONOCYTES NFR BLD AUTO: 5.9 %
NEUTROPHILS # BLD AUTO: 3.3 X10*3/UL (ref 1.2–7.7)
NEUTROPHILS NFR BLD AUTO: 61.2 %
NON HDL CHOLESTEROL: 114 MG/DL (ref 0–149)
NRBC BLD-RTO: 0 /100 WBCS (ref 0–0)
PLATELET # BLD AUTO: 190 X10*3/UL (ref 150–450)
POTASSIUM SERPL-SCNC: 4.2 MMOL/L (ref 3.5–5.3)
PROT SERPL-MCNC: 6.6 G/DL (ref 6.4–8.2)
RBC # BLD AUTO: 6.13 X10*6/UL (ref 4.5–5.9)
SODIUM SERPL-SCNC: 141 MMOL/L (ref 136–145)
TRIGL SERPL-MCNC: 109 MG/DL (ref 0–149)
TSH SERPL-ACNC: 1.49 MIU/L (ref 0.44–3.98)
URATE SERPL-MCNC: 7.4 MG/DL (ref 4–7.5)
VLDL: 22 MG/DL (ref 0–40)
WBC # BLD AUTO: 5.4 X10*3/UL (ref 4.4–11.3)

## 2024-03-05 PROCEDURE — 80053 COMPREHEN METABOLIC PANEL: CPT

## 2024-03-05 PROCEDURE — 83036 HEMOGLOBIN GLYCOSYLATED A1C: CPT

## 2024-03-05 PROCEDURE — 84443 ASSAY THYROID STIM HORMONE: CPT

## 2024-03-05 PROCEDURE — 84154 ASSAY OF PSA FREE: CPT

## 2024-03-05 PROCEDURE — 84550 ASSAY OF BLOOD/URIC ACID: CPT

## 2024-03-05 PROCEDURE — 82728 ASSAY OF FERRITIN: CPT

## 2024-03-05 PROCEDURE — 80061 LIPID PANEL: CPT

## 2024-03-05 PROCEDURE — 82172 ASSAY OF APOLIPOPROTEIN: CPT

## 2024-03-05 PROCEDURE — 84153 ASSAY OF PSA TOTAL: CPT

## 2024-03-05 PROCEDURE — 85025 COMPLETE CBC W/AUTO DIFF WBC: CPT

## 2024-03-05 PROCEDURE — 36415 COLL VENOUS BLD VENIPUNCTURE: CPT

## 2024-03-06 ENCOUNTER — TELEPHONE (OUTPATIENT)
Dept: PRIMARY CARE | Facility: CLINIC | Age: 64
End: 2024-03-06
Payer: COMMERCIAL

## 2024-03-06 DIAGNOSIS — K75.9 HEPATITIS: Primary | ICD-10-CM

## 2024-03-06 LAB
FERRITIN SERPL-MCNC: 335 NG/ML (ref 20–300)
LPA SERPL-MCNC: <6 MG/DL

## 2024-03-07 LAB
PSA FREE MFR SERPL: 40 %
PSA FREE SERPL-MCNC: 0.6 NG/ML
PSA SERPL IA-MCNC: 1.5 NG/ML (ref 0–4)

## 2024-08-29 ENCOUNTER — TELEPHONE (OUTPATIENT)
Dept: PRIMARY CARE | Facility: CLINIC | Age: 64
End: 2024-08-29
Payer: COMMERCIAL

## 2024-08-29 DIAGNOSIS — E11.9 TYPE 2 DIABETES MELLITUS WITHOUT COMPLICATION, WITHOUT LONG-TERM CURRENT USE OF INSULIN (MULTI): Primary | ICD-10-CM

## 2024-08-29 DIAGNOSIS — R53.83 FATIGUE, UNSPECIFIED TYPE: Primary | ICD-10-CM

## 2024-08-29 DIAGNOSIS — Z00.00 ROUTINE GENERAL MEDICAL EXAMINATION AT A HEALTH CARE FACILITY: ICD-10-CM

## 2024-08-29 DIAGNOSIS — K75.9 HEPATITIS: ICD-10-CM

## 2024-08-30 ENCOUNTER — LAB (OUTPATIENT)
Dept: LAB | Facility: LAB | Age: 64
End: 2024-08-30
Payer: COMMERCIAL

## 2024-08-30 DIAGNOSIS — K75.9 HEPATITIS: ICD-10-CM

## 2024-08-30 DIAGNOSIS — E11.9 TYPE 2 DIABETES MELLITUS WITHOUT COMPLICATION, WITHOUT LONG-TERM CURRENT USE OF INSULIN (MULTI): ICD-10-CM

## 2024-08-30 DIAGNOSIS — R53.83 FATIGUE, UNSPECIFIED TYPE: ICD-10-CM

## 2024-08-30 LAB
ALBUMIN SERPL BCP-MCNC: 4.1 G/DL (ref 3.4–5)
ALP SERPL-CCNC: 95 U/L (ref 33–136)
ALT SERPL W P-5'-P-CCNC: 31 U/L (ref 10–52)
AMYLASE SERPL-CCNC: 47 U/L (ref 29–103)
ANION GAP SERPL CALC-SCNC: 12 MMOL/L (ref 10–20)
AST SERPL W P-5'-P-CCNC: 30 U/L (ref 9–39)
BASOPHILS # BLD AUTO: 0.03 X10*3/UL (ref 0–0.1)
BASOPHILS NFR BLD AUTO: 0.6 %
BILIRUB SERPL-MCNC: 0.6 MG/DL (ref 0–1.2)
BUN SERPL-MCNC: 25 MG/DL (ref 6–23)
CALCIUM SERPL-MCNC: 9.2 MG/DL (ref 8.6–10.6)
CHLORIDE SERPL-SCNC: 104 MMOL/L (ref 98–107)
CHOLEST SERPL-MCNC: 164 MG/DL (ref 0–199)
CHOLESTEROL/HDL RATIO: 2.3
CO2 SERPL-SCNC: 28 MMOL/L (ref 21–32)
CREAT SERPL-MCNC: 1.32 MG/DL (ref 0.5–1.3)
EGFRCR SERPLBLD CKD-EPI 2021: 60 ML/MIN/1.73M*2
EOSINOPHIL # BLD AUTO: 0.15 X10*3/UL (ref 0–0.7)
EOSINOPHIL NFR BLD AUTO: 2.8 %
ERYTHROCYTE [DISTWIDTH] IN BLOOD BY AUTOMATED COUNT: 12.5 % (ref 11.5–14.5)
EST. AVERAGE GLUCOSE BLD GHB EST-MCNC: 94 MG/DL
FERRITIN SERPL-MCNC: 317 NG/ML (ref 20–300)
GLUCOSE SERPL-MCNC: 120 MG/DL (ref 74–99)
HBA1C MFR BLD: 4.9 %
HCT VFR BLD AUTO: 50.8 % (ref 41–52)
HDLC SERPL-MCNC: 71.3 MG/DL
HGB BLD-MCNC: 17.7 G/DL (ref 13.5–17.5)
IMM GRANULOCYTES # BLD AUTO: 0.01 X10*3/UL (ref 0–0.7)
IMM GRANULOCYTES NFR BLD AUTO: 0.2 % (ref 0–0.9)
LDLC SERPL CALC-MCNC: 77 MG/DL
LIPASE SERPL-CCNC: 34 U/L (ref 9–82)
LYMPHOCYTES # BLD AUTO: 1.76 X10*3/UL (ref 1.2–4.8)
LYMPHOCYTES NFR BLD AUTO: 32.7 %
MCH RBC QN AUTO: 30.6 PG (ref 26–34)
MCHC RBC AUTO-ENTMCNC: 34.8 G/DL (ref 32–36)
MCV RBC AUTO: 88 FL (ref 80–100)
MONOCYTES # BLD AUTO: 0.38 X10*3/UL (ref 0.1–1)
MONOCYTES NFR BLD AUTO: 7.1 %
NEUTROPHILS # BLD AUTO: 3.05 X10*3/UL (ref 1.2–7.7)
NEUTROPHILS NFR BLD AUTO: 56.6 %
NON HDL CHOLESTEROL: 93 MG/DL (ref 0–149)
NRBC BLD-RTO: 0 /100 WBCS (ref 0–0)
PLATELET # BLD AUTO: 169 X10*3/UL (ref 150–450)
POTASSIUM SERPL-SCNC: 4.1 MMOL/L (ref 3.5–5.3)
PROT SERPL-MCNC: 6.5 G/DL (ref 6.4–8.2)
RBC # BLD AUTO: 5.79 X10*6/UL (ref 4.5–5.9)
SODIUM SERPL-SCNC: 140 MMOL/L (ref 136–145)
TRIGL SERPL-MCNC: 80 MG/DL (ref 0–149)
TSH SERPL-ACNC: 1.16 MIU/L (ref 0.44–3.98)
VLDL: 16 MG/DL (ref 0–40)
WBC # BLD AUTO: 5.4 X10*3/UL (ref 4.4–11.3)

## 2024-08-30 PROCEDURE — 83036 HEMOGLOBIN GLYCOSYLATED A1C: CPT

## 2024-08-30 PROCEDURE — 86256 FLUORESCENT ANTIBODY TITER: CPT

## 2024-08-30 PROCEDURE — 82150 ASSAY OF AMYLASE: CPT

## 2024-08-30 PROCEDURE — 36415 COLL VENOUS BLD VENIPUNCTURE: CPT

## 2024-08-30 PROCEDURE — 84402 ASSAY OF FREE TESTOSTERONE: CPT

## 2024-08-30 PROCEDURE — 80061 LIPID PANEL: CPT

## 2024-08-30 PROCEDURE — 86015 ACTIN ANTIBODY EACH: CPT

## 2024-08-30 PROCEDURE — 80053 COMPREHEN METABOLIC PANEL: CPT

## 2024-08-30 PROCEDURE — 84443 ASSAY THYROID STIM HORMONE: CPT

## 2024-08-30 PROCEDURE — 82728 ASSAY OF FERRITIN: CPT

## 2024-08-30 PROCEDURE — 85025 COMPLETE CBC W/AUTO DIFF WBC: CPT

## 2024-08-30 PROCEDURE — 83690 ASSAY OF LIPASE: CPT

## 2024-08-31 ENCOUNTER — TELEPHONE (OUTPATIENT)
Dept: PRIMARY CARE | Facility: CLINIC | Age: 64
End: 2024-08-31
Payer: COMMERCIAL

## 2024-08-31 DIAGNOSIS — K75.81 STEATOHEPATITIS: Primary | ICD-10-CM

## 2024-09-03 LAB — SMOOTH MUSCLE AB SER QL IF: ABNORMAL

## 2024-09-06 LAB
TESTOSTERONE FREE (CHAN): 57.7 PG/ML (ref 35–155)
TESTOSTERONE,TOTAL,LC-MS/MS: 493 NG/DL (ref 250–1100)

## 2024-09-20 ENCOUNTER — HOSPITAL ENCOUNTER (OUTPATIENT)
Dept: RADIOLOGY | Facility: HOSPITAL | Age: 64
Discharge: HOME | End: 2024-09-20
Payer: COMMERCIAL

## 2024-09-20 DIAGNOSIS — K75.81 STEATOHEPATITIS: ICD-10-CM

## 2024-09-20 PROCEDURE — 74150 CT ABDOMEN W/O CONTRAST: CPT

## 2024-09-23 DIAGNOSIS — K76.0 STEATOSIS OF LIVER: Primary | ICD-10-CM

## 2024-09-23 DIAGNOSIS — R79.89 ELEVATED LFTS: ICD-10-CM

## 2024-09-23 DIAGNOSIS — R93.2 ABNORMAL CT OF LIVER: ICD-10-CM

## 2024-09-23 NOTE — PROGRESS NOTES
Discussed recent CT results with Dr. Chong.  Plan for ELF, HFE genetics and FibroScan for further work-up and evaluation.    Jt Leslie MD

## 2024-10-03 DIAGNOSIS — M25.521 RIGHT ELBOW PAIN: Primary | ICD-10-CM

## 2024-10-04 ENCOUNTER — OFFICE VISIT (OUTPATIENT)
Dept: ORTHOPEDIC SURGERY | Facility: HOSPITAL | Age: 64
End: 2024-10-04
Payer: COMMERCIAL

## 2024-10-04 ENCOUNTER — HOSPITAL ENCOUNTER (OUTPATIENT)
Dept: RADIOLOGY | Facility: HOSPITAL | Age: 64
Discharge: HOME | End: 2024-10-04
Payer: COMMERCIAL

## 2024-10-04 ENCOUNTER — CLINICAL SUPPORT (OUTPATIENT)
Dept: GASTROENTEROLOGY | Facility: HOSPITAL | Age: 64
End: 2024-10-04
Payer: COMMERCIAL

## 2024-10-04 DIAGNOSIS — M25.521 RIGHT ELBOW PAIN: Primary | ICD-10-CM

## 2024-10-04 DIAGNOSIS — R79.89 ELEVATED LFTS: ICD-10-CM

## 2024-10-04 DIAGNOSIS — R93.2 ABNORMAL CT OF LIVER: ICD-10-CM

## 2024-10-04 DIAGNOSIS — K76.0 STEATOSIS OF LIVER: ICD-10-CM

## 2024-10-04 DIAGNOSIS — M25.521 RIGHT ELBOW PAIN: ICD-10-CM

## 2024-10-04 PROCEDURE — 99213 OFFICE O/P EST LOW 20 MIN: CPT | Performed by: ORTHOPAEDIC SURGERY

## 2024-10-04 PROCEDURE — 73080 X-RAY EXAM OF ELBOW: CPT | Mod: RT

## 2024-10-04 NOTE — PROGRESS NOTES
Subjective   Patient ID: Mane Chong is a 64 y.o. 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 He would have a sharp pain. It has worsened over the last 2 months and he mainly feels it with exercise. The pain is only with activity. It is sharp in nature and does resolve on its own.     Objective   Patient is a well-developed, well-nourished male in no acute distress.  Breathes with normal chest rises.  Pupils are round and symmetric today.  Awake, alert, and oriented x3.     Examination of the left elbow today reveals the skin to be intact. No evidence of ecchymosis, bruising, lesions, or scarring. The epitrochlear lymph node exam at the time of visit was negative bilaterally. 5 out of 5 wrist flexion, wrist extension, and thumb extension bilaterally. Sensation is intact to light touch to median, ulnar, and radial nerves bilaterally. Stable to varus and valgus stress bilaterally.  Range of motion of the left elbow revealed 0-150° of flexion and extension and 85 degrees of supination and pronation. Patient had no tenderness to palpation at their medial or lateral epicondyle.      Examination of the right elbow today reveals the skin to be intact. No evidence of ecchymosis, bruising, lesions, or scarring. The epitrochlear lymph node exam at the time of visit was negative bilaterally. 5 out of 5 wrist flexion, wrist extension, and thumb extension bilaterally. Sensation is intact to light touch to median, ulnar, and radial nerves bilaterally. Stable to varus and valgus stress bilaterally.  Range of motion of the right elbow revealed 0-145° of flexion and extension and 85 degrees of supination and  pronation. Patient had no tenderness to palpation at their medial or lateral epicondyle.        Imaging:  X-rays of the right elbow taken today personally ordered and interpreted by me shows a traction spur in the triceps muscle.     Assessment/Plan   No diagnosis found.  Patient with right elbow triceps olecranon spur    We had a long discussion regarding his diagnosis. We talked about his treatment options. We are going to manage this conservatively at this time. We did talk about surgery that would require him to have 2 weeks out of work. The surgery would be out patient and we will have to protect the triceps after surgery. Ultimately surgery would give him relief but he would rose to avoid this at this time. Injection is not indiated at this time. He knows that he will feel this with terminal extension, and we discussed avoiding this if possible while exercising.     No orders of the defined types were placed in this encounter.        Follow up as needed    Scribe Attestation  By signing my name below, Brittany SESAY , Scribe   attest that this documentation has been prepared under the direction and in the presence of Lavell Marquis MD.

## 2024-10-29 ENCOUNTER — LAB (OUTPATIENT)
Dept: LAB | Facility: LAB | Age: 64
End: 2024-10-29
Payer: COMMERCIAL

## 2024-10-29 DIAGNOSIS — K76.0 STEATOSIS OF LIVER: ICD-10-CM

## 2024-10-29 DIAGNOSIS — R93.2 ABNORMAL CT OF LIVER: ICD-10-CM

## 2024-10-29 DIAGNOSIS — R79.89 ELEVATED LFTS: ICD-10-CM

## 2024-10-29 PROCEDURE — G0452 MOLECULAR PATHOLOGY INTERPR: HCPCS | Performed by: INTERNAL MEDICINE

## 2024-10-29 PROCEDURE — 36415 COLL VENOUS BLD VENIPUNCTURE: CPT

## 2024-10-29 PROCEDURE — 81517 LIVER DS ALYS 3 BMRK SRM ALG: CPT

## 2024-10-29 PROCEDURE — 81256 HFE GENE: CPT

## 2024-10-31 LAB — SCAN RESULT: NORMAL

## 2024-11-01 DIAGNOSIS — R79.89 ELEVATED LFTS: Primary | ICD-10-CM

## 2024-11-04 LAB
ELECTRONICALLY SIGNED BY: ABNORMAL
HFE GENE MUT TESTED BLD/T: ABNORMAL
HFE P.C282Y BLD/T QL: ABNORMAL
HFE P.H63D BLD/T QL: NORMAL

## 2024-11-05 DIAGNOSIS — K76.0 METABOLIC DYSFUNCTION-ASSOCIATED STEATOTIC LIVER DISEASE (MASLD): ICD-10-CM

## 2024-11-05 DIAGNOSIS — K76.0 STEATOSIS OF LIVER: Primary | ICD-10-CM

## 2024-11-05 NOTE — PROGRESS NOTES
ELF 10.89 (F3)  FibroScan 7.7 kPa (F1-2)  Discussed discordant results and recommended MRI Liver with Elastography, PDFF, Iron quant.    Jt Leslie MD

## 2025-01-22 ENCOUNTER — HOSPITAL ENCOUNTER (OUTPATIENT)
Dept: RADIOLOGY | Facility: HOSPITAL | Age: 65
Discharge: HOME | End: 2025-01-22
Payer: COMMERCIAL

## 2025-01-22 DIAGNOSIS — K76.0 STEATOSIS OF LIVER: ICD-10-CM

## 2025-01-22 DIAGNOSIS — K76.0 METABOLIC DYSFUNCTION-ASSOCIATED STEATOTIC LIVER DISEASE (MASLD): ICD-10-CM

## 2025-01-22 PROCEDURE — 2550000001 HC RX 255 CONTRASTS: Performed by: INTERNAL MEDICINE

## 2025-01-22 PROCEDURE — A9575 INJ GADOTERATE MEGLUMI 0.1ML: HCPCS | Performed by: INTERNAL MEDICINE

## 2025-01-22 PROCEDURE — 74183 MRI ABD W/O CNTR FLWD CNTR: CPT

## 2025-01-22 RX ORDER — GADOTERATE MEGLUMINE 376.9 MG/ML
20 INJECTION INTRAVENOUS
Status: COMPLETED | OUTPATIENT
Start: 2025-01-22 | End: 2025-01-22

## 2025-01-22 RX ADMIN — GADOTERATE MEGLUMINE 20 ML: 376.9 INJECTION INTRAVENOUS at 08:05
